# Patient Record
Sex: MALE | Race: WHITE
[De-identification: names, ages, dates, MRNs, and addresses within clinical notes are randomized per-mention and may not be internally consistent; named-entity substitution may affect disease eponyms.]

---

## 2017-02-16 ENCOUNTER — HOSPITAL ENCOUNTER (OUTPATIENT)
Dept: HOSPITAL 45 - C.LAB | Age: 77
Discharge: HOME | End: 2017-02-16
Payer: COMMERCIAL

## 2017-02-16 DIAGNOSIS — D64.9: Primary | ICD-10-CM

## 2017-02-16 LAB
BASOPHILS # BLD: 0.03 K/UL (ref 0–0.2)
BASOPHILS NFR BLD: 0.7 %
COMPLETE: YES
EOSINOPHIL NFR BLD AUTO: 274 K/UL (ref 130–400)
FERRITIN SERPL-MCNC: 116.8 NG/ML (ref 8–388)
HCT VFR BLD CALC: 34.2 % (ref 42–52)
IG%: 0.2 %
IMM GRANULOCYTES NFR BLD AUTO: 15.8 %
LYMPHOCYTES # BLD: 0.7 K/UL (ref 1.2–3.4)
MCH RBC QN AUTO: 32.1 PG (ref 25–34)
MCHC RBC AUTO-ENTMCNC: 35.4 G/DL (ref 32–36)
MCV RBC AUTO: 90.7 FL (ref 80–100)
MONOCYTES NFR BLD: 12.4 %
NEUTROPHILS # BLD AUTO: 4.8 %
NEUTROPHILS NFR BLD AUTO: 66.1 %
PMV BLD AUTO: 8.7 FL (ref 7.4–10.4)
RBC # BLD AUTO: 3.77 M/UL (ref 4.7–6.1)
WBC # BLD AUTO: 4.42 K/UL (ref 4.8–10.8)

## 2017-07-03 ENCOUNTER — HOSPITAL ENCOUNTER (OUTPATIENT)
Dept: HOSPITAL 45 - X.SURG | Age: 77
Discharge: HOME | End: 2017-07-03
Attending: OPHTHALMOLOGY
Payer: COMMERCIAL

## 2017-07-03 VITALS
DIASTOLIC BLOOD PRESSURE: 92 MMHG | OXYGEN SATURATION: 100 % | HEART RATE: 51 BPM | TEMPERATURE: 97.16 F | SYSTOLIC BLOOD PRESSURE: 156 MMHG

## 2017-07-03 VITALS
BODY MASS INDEX: 26.12 KG/M2 | WEIGHT: 170.35 LBS | HEIGHT: 67.52 IN | BODY MASS INDEX: 26.12 KG/M2 | HEIGHT: 67.52 IN | WEIGHT: 170.35 LBS

## 2017-07-03 DIAGNOSIS — I10: ICD-10-CM

## 2017-07-03 DIAGNOSIS — M19.90: ICD-10-CM

## 2017-07-03 DIAGNOSIS — D64.9: ICD-10-CM

## 2017-07-03 DIAGNOSIS — H26.9: Primary | ICD-10-CM

## 2017-07-03 RX ADMIN — TROPICAMIDE SCH DROP: 10 SOLUTION/ DROPS OPHTHALMIC at 10:17

## 2017-07-03 RX ADMIN — MOXIFLOXACIN HYDROCHLORIDE SCH DROP: 5 SOLUTION/ DROPS OPHTHALMIC at 10:30

## 2017-07-03 RX ADMIN — MOXIFLOXACIN HYDROCHLORIDE SCH DROP: 5 SOLUTION/ DROPS OPHTHALMIC at 10:20

## 2017-07-03 RX ADMIN — PHENYLEPHRINE HYDROCHLORIDE SCH DROP: 25 SOLUTION/ DROPS OPHTHALMIC at 10:16

## 2017-07-03 RX ADMIN — KETOROLAC TROMETHAMINE SCH DROP: 5 SOLUTION OPHTHALMIC at 10:24

## 2017-07-03 RX ADMIN — PHENYLEPHRINE HYDROCHLORIDE SCH DROP: 25 SOLUTION/ DROPS OPHTHALMIC at 10:21

## 2017-07-03 RX ADMIN — CYCLOPENTOLATE HYDROCHLORIDE SCH DROP: 10 SOLUTION/ DROPS OPHTHALMIC at 10:18

## 2017-07-03 RX ADMIN — TROPICAMIDE SCH DROP: 10 SOLUTION/ DROPS OPHTHALMIC at 10:22

## 2017-07-03 RX ADMIN — KETOROLAC TROMETHAMINE SCH DROP: 5 SOLUTION OPHTHALMIC at 10:19

## 2017-07-03 RX ADMIN — CYCLOPENTOLATE HYDROCHLORIDE SCH DROP: 10 SOLUTION/ DROPS OPHTHALMIC at 10:23

## 2017-07-03 NOTE — ANESTHESIA PROGRESS NT - [facility]
Anesthesia Post Op Note


Date & Time


Jul 3, 2017 at 12:07





Vital Signs


Pain Intensity:  0





Vital Signs Past 12 Hours








  Date Time  Temp Pulse Resp B/P (MAP) Pulse Ox O2 Delivery O2 Flow Rate FiO2


 


7/3/17 11:39 36.0 66 16 150/95 (113) 97 Room Air  


 


7/3/17 09:59 36.4 52 16 159/92 (114) 100 Room Air  











Notes


Mental Status:  alert / awake / arousable, participated in evaluation


Pt Amnestic to Procedure:  Yes


Nausea / Vomiting:  adequately controlled


Pain:  adequately controlled


Airway Patency, RR, SpO2:  stable & adequate


BP & HR:  stable & adequate


Hydration State:  stable & adequate


Anesthetic Complications:  no major complications apparent

## 2017-07-03 NOTE — [facility] OPERATIVE REPORT
Operative Report


Operative Date


Jul 3, 2017.





Pre-Operative Diagnosis





Cataract Left Eye





Post-Operative Diagnosis





Same





Procedure(s) Performed





Left Cataract Phacoemulsification With Intraocular Lens Implant





Surgeon


Dr. Mcmahan





Assistant Surgeon(s)


None





Estimated Blood Loss


0 mL





Findings


cataract left eye





Specimens





None





Drains


none





Anesthesia


local with sedation





Complication(s)


None





Disposition


Recovery Room / PACU





Implants


MX60 17.0





Indications


decreased vision left eye





Description of Procedure


After informed consent was obtained in the holding area the patient was


wheeled back to the operating room where cardiac monitoring leads and oxygen


by nasal cannula was administered by Anesthesia. Gentle IV sedation was


given, and the patient's left eye was prepped and draped in usual sterile


fashion. A wire lid speculum was placed into the left eye and the operating


microscope was swung into position. Using 0.12 forceps and a Supersharp blade


a paracentesis port was made 3 o'clock hours away from the 3 o'clock position


of the patient's left eye. 1% non-preserved Lidocaine was then injected into


the anterior chamber for anesthesia followed by 1ml of nonpreserved epinephrine 

in 3ml of bss for pupillary dilation.  A 2.2 mm keratotome blade was then used


to make a shelved clear corneal incision at the 3 o'clock position of the


left eye. Amvisc was injected into the anterior chamber and a cystotome and


Utrata forceps were used to perform a curvilinear capsulorrhexis. BSS on a


hydrodissection cannula was used to hydrodissect the lens nucleus away from


the capsular bag. The phacoemulsification handpiece was then used in a stop


and chop fashion to remove the lens nucleus. The irrigation and aspiration


handpiece was then used to remove the residual cortical material. Amvisc was


injected into the capsular bag and anterior chamber and a [employer] MX60


17.0 Diopter intraocular lens was injected into the capsular bag.  Irrigation


and aspiration handpiece was used to remove the residual viscoelastic


material. The wounds were hydrated and noted to be watertight.  The wire lid


speculum was removed from the eye.  Vigamox, Brimonidine, and TobraDex


ointment were placed on the eye and it was shielded.  It should be noted that


EndoCoat was used extensively during the case to protect the cornea endothelium.


 


DISPOSITION:  The patient tolerated the procedure well and was wheeled to the


post anesthesia care unit in stable condition.


I attest to the content of the Intraoperative Record and any orders documented 

therein.  Any exceptions are noted below.


I attest to the content of the Intraoperative Record and any orders documented 

therein.  Any exceptions are noted below.

## 2017-07-03 NOTE — DISCHARGE INSTRUCTIONS-[facility]
Discharge Instructions


Date of Service


Jul 3, 2017.





Visit


Reason for Visit:  Cataract Left Eye





Discharge


Discharge Diagnosis / Problem:  cataract left eye





Discharge Goals


Goal(s):  Improve function





Activity Recommendations


Activity Limitations:  per Instructions/Follow-up section


Lifting Limitations:  no more than 5 pounds





Anesthesia


.





Post Anesthesia Instructions:





If you have had General Anesthesia or IV Sedation:





*  Do not drive today.


*  Resume driving when surgeon permits.


*  Do not make important decisions or sign legal documents today.


*  Call surgeon for:





   1.  Temperature elevations greater than 101 degrees F.


   2.  Uncontrollable pain.


   3.  Excessive bleeding.


   4.  Persistent nausea and vomiting.


   5.  Medication intolerance (nausea, vomiting or rash).





*  For nausea and vomiting use only clear liquids such as: tea, soda, bouillon 

until nausea subsides, then gradually increase diet as tolerated.





*  If you have any concerns or questions, call your surgeon's office.  If 

physician is unavailable and it is an emergency, call 911 or go to the nearest 

emergency room.





.





Instructions / Follow-Up


Instructions / Follow-Up





ACTIVITY RECOMMENDATIONS:





*  Light activities





*  You may walk outside, read, watch television.





*  Mild irritation and blurred vision are common for the first few days, 

redness around the white part of the eye is common.








MEDICATIONS:





Resume previous medications unless instructed otherwise by your surgeon.





Eye drops (today and tomorrow):


   


   Cipro - one drop in operative eye every 2 hours while awake


   Prednisolone 1% - one drop in operative eye every 2 hours while awake


   Ilevro - one drop operative eye 1 times daily








SPECIAL CARE INSTRUCTIONS:





*  If any problems or concerns, please call Dr. Mcmahan's office at [phone].





*  Keep plastic shield taped over eye to sleep at night.





*  Keep plastic shield taped over eye except to administer eye drops.





*  Keep plastic shield on until office visit the following day.








FOLLOW UP VISIT:





Follow-up with Dr. Mcmahan in the [place] office as scheduled.





If not already scheduled, please call the office at [phone].





Diet Recommendations


Home Diet:  resume previous diet





Procedures


Procedures Performed:  


Left Cataract Phacoemulsification With Intraocular Lens Implant





Pending Studies


Studies pending at discharge:  no





Medical Emergencies








.


Who to Call and When:





Medical Emergencies:  If at any time you feel your situation is an emergency, 

please call 911 immediately.





.





Non-Emergent Contact


Non-Emergency issues call your:  Ophthalmologist





.


.








"Provider Documentation" section prepared by Farhan Mcmahan.








.

## 2017-07-05 ENCOUNTER — HOSPITAL ENCOUNTER (OUTPATIENT)
Dept: HOSPITAL 45 - C.CTS | Age: 77
Discharge: HOME | End: 2017-07-05
Attending: ORTHOPAEDIC SURGERY
Payer: COMMERCIAL

## 2017-07-05 DIAGNOSIS — M19.019: Primary | ICD-10-CM

## 2017-07-05 LAB
ANION GAP SERPL CALC-SCNC: 6 MMOL/L (ref 3–11)
APPEARANCE UR: CLEAR
BASOPHILS # BLD: 0.03 K/UL (ref 0–0.2)
BASOPHILS NFR BLD: 0.7 %
BILIRUB UR-MCNC: (no result) MG/DL
BUN SERPL-MCNC: 26 MG/DL (ref 7–18)
BUN/CREAT SERPL: 24.1 (ref 10–20)
CALCIUM SERPL-MCNC: 9.7 MG/DL (ref 8.5–10.1)
CHLORIDE SERPL-SCNC: 106 MMOL/L (ref 98–107)
CO2 SERPL-SCNC: 30 MMOL/L (ref 21–32)
COLOR UR: YELLOW
COMPLETE: YES
CREAT CL PREDICTED SERPL C-G-VRATE: 57 ML/MIN
CREAT SERPL-MCNC: 1.1 MG/DL (ref 0.6–1.4)
EOSINOPHIL NFR BLD AUTO: 287 K/UL (ref 130–400)
GLUCOSE SERPL-MCNC: 98 MG/DL (ref 70–99)
HCT VFR BLD CALC: 37.9 % (ref 42–52)
IG%: 0.2 %
IMM GRANULOCYTES NFR BLD AUTO: 13.6 %
INR PPP: 1.1 (ref 0.9–1.1)
LYMPHOCYTES # BLD: 0.6 K/UL (ref 1.2–3.4)
MANUAL MICROSCOPIC REQUIRED?: NO
MCH RBC QN AUTO: 32 PG (ref 25–34)
MCHC RBC AUTO-ENTMCNC: 34.6 G/DL (ref 32–36)
MCV RBC AUTO: 92.7 FL (ref 80–100)
MONOCYTES NFR BLD: 12.4 %
NEUTROPHILS # BLD AUTO: 3.8 %
NEUTROPHILS NFR BLD AUTO: 69.3 %
NITRITE UR QL STRIP: (no result)
PARTIAL THROMBOPLASTIN RATIO: 1
PH UR STRIP: 7.5 [PH] (ref 4.5–7.5)
PMV BLD AUTO: 8.6 FL (ref 7.4–10.4)
POTASSIUM SERPL-SCNC: 4.3 MMOL/L (ref 3.5–5.1)
PROTHROMBIN TIME: 11.3 SECONDS (ref 9–12)
RBC # BLD AUTO: 4.09 M/UL (ref 4.7–6.1)
REVIEW REQ?: NO
SODIUM SERPL-SCNC: 142 MMOL/L (ref 136–145)
SP GR UR STRIP: 1.01 (ref 1–1.03)
URINE BILL WITH OR WITHOUT MIC: (no result)
UROBILINOGEN UR-MCNC: (no result) MG/DL
WBC # BLD AUTO: 4.42 K/UL (ref 4.8–10.8)
ZZUR CULT IF INDIC CLEAN CATCH: NO

## 2017-07-05 NOTE — PAT MEDICATION INSTRUCTIONS
Service Date


Jul 5, 2017.





Current Home Medication List


Alprazolam (Xanax), 0.5 MG PO HS


Aspirin Enteric Coated (Ecotrin Or Generic), 81 MG PO QPM


Calcium/Vitamin D (Os-Alexander 500 Plus D), 1 TAB PO BID


Ezetimibe/Simvastatin (Vytorin 10MG/20MG), 0.5 TABLET PO HS


Fish Oil (Barnard-3), 1 CAP PO BID


Flaxseed (Linseed) (Flaxseed Oil), 1 TAB PO NOON


Glucosamine Sulfate (Glucosamine), 1,000 MG PO NOON


Glucosamine-Chondroitin-Vit C- (Glucosamine Chondroitin), 1 TAB PO BID


Ibuprofen (Advil), 200-800 MG PO PRN


Irbesartan (Avapro), 150 MG PO QAM


Melatonin (Kp Melatonin), 1 TAB PO HS


Metoprolol Tartrate (Lopressor), 25 MG PO BID


Multivitamin (Multivitamin), 1 TABLET PO QAM


Omeprazole (Ra Omeprazole), 20 MG PO QPM


Terazosin Hcl (Hytrin), 1 MG PO HS


[Natural Gh Formula], 1 TAB PO HS





Medication Instructions


For Your Scheduled Surgery 








- Hold the following medications 2 weeks prior to surgery:


Fish Oil (Barnard-3), 1 CAP PO BID


Flaxseed (Linseed) (Flaxseed Oil), 1 TAB PO NOON


Glucosamine Sulfate (Glucosamine), 1,000 MG PO NOON


Glucosamine-Chondroitin-Vit C- (Glucosamine Chondroitin), 1 TAB PO BID


[Natural Gh Formula], 1 TAB PO HS














- Hold the following medications the morning of surgery:


Irbesartan (Avapro), 150 MG PO QAM


Multivitamin (Multivitamin), 1 TABLET PO QAM


Calcium/Vitamin D (Os-Alexander 500 Plus D), 1 TAB PO BID


Ibuprofen (Advil), 200-800 MG PO PRN (otherwise okay to continue per surgeon)














- Take the following medications the morning of surgery with a sip of water 

OTHERWISE NOTHING TO EAT OR DRINK AFTER MIDNIGHT:


Metoprolol Tartrate (Lopressor), 25 MG PO BID











- Take the following medications as scheduled the night before surgery:


Ezetimibe/Simvastatin (Vytorin 10MG/20MG), 0.5 TABLET PO HS


Alprazolam (Xanax), 0.5 MG PO HS


Aspirin Enteric Coated (Ecotrin Or Generic), 81 MG PO QPM


Omeprazole (Ra Omeprazole), 20 MG PO QPM


Terazosin Hcl (Hytrin), 1 MG PO HS


Melatonin (Kp Melatonin), 1 TAB PO HS


Metoprolol Tartrate (Lopressor), 25 MG PO BID


Calcium/Vitamin D (Os-Alexander 500 Plus D), 1 TAB PO BID











If you have any questions please call us at [phone] or [phone] or 

[phone]

## 2017-07-05 NOTE — DIAGNOSTIC IMAGING REPORT
TWO VIEW CHEST



CLINICAL HISTORY: Preoperative examination.



FINDINGS: PA and lateral chest radiographs are compared to study dated 4/20/2016

and correlated with chest CT dated 4/27/2016. The cardiomediastinal silhouette

is unremarkable.  There is atherosclerotic calcification of the thoracic aorta.

The lungs and pleural spaces are clear. There is no pneumothorax. There are

healed right-sided rib fractures. Degenerative change and scoliosis are noted in

the thoracic spine.



IMPRESSION: No active disease in the chest.







Electronically signed by:  Dong Beth M.D.

7/5/2017 2:53 PM



Dictated Date/Time:  7/5/2017 2:52 PM

## 2017-09-01 ENCOUNTER — HOSPITAL ENCOUNTER (OUTPATIENT)
Dept: HOSPITAL 45 - C.LAB | Age: 77
Discharge: HOME | End: 2017-09-01
Attending: ORTHOPAEDIC SURGERY
Payer: COMMERCIAL

## 2017-09-01 DIAGNOSIS — I10: ICD-10-CM

## 2017-09-01 DIAGNOSIS — M19.012: ICD-10-CM

## 2017-09-01 DIAGNOSIS — Z01.812: Primary | ICD-10-CM

## 2017-09-01 DIAGNOSIS — E78.5: ICD-10-CM

## 2017-09-01 LAB
ALT SERPL-CCNC: 30 U/L (ref 12–78)
ANION GAP SERPL CALC-SCNC: 4 MMOL/L (ref 3–11)
APPEARANCE UR: CLEAR
AST SERPL-CCNC: 24 U/L (ref 15–37)
BASOPHILS # BLD: 0.02 K/UL (ref 0–0.2)
BASOPHILS NFR BLD: 0.6 %
BILIRUB UR-MCNC: (no result) MG/DL
BUN SERPL-MCNC: 30 MG/DL (ref 7–18)
BUN/CREAT SERPL: 30.2 (ref 10–20)
CALCIUM SERPL-MCNC: 9 MG/DL (ref 8.5–10.1)
CHLORIDE SERPL-SCNC: 106 MMOL/L (ref 98–107)
CHOLEST/HDLC SERPL: 3.2 {RATIO}
CO2 SERPL-SCNC: 29 MMOL/L (ref 21–32)
COLOR UR: YELLOW
COMPLETE: YES
CREAT SERPL-MCNC: 1 MG/DL (ref 0.6–1.4)
EOSINOPHIL NFR BLD AUTO: 279 K/UL (ref 130–400)
GLUCOSE SERPL-MCNC: 86 MG/DL (ref 70–99)
GLUCOSE UR QL: 32 MG/DL
HCT VFR BLD CALC: 39.8 % (ref 42–52)
IG%: 0 %
IMM GRANULOCYTES NFR BLD AUTO: 13.6 %
INR PPP: 1.1 (ref 0.9–1.1)
KETONES UR QL STRIP: 61 MG/DL
LYMPHOCYTES # BLD: 0.49 K/UL (ref 1.2–3.4)
MANUAL MICROSCOPIC REQUIRED?: NO
MCH RBC QN AUTO: 32 PG (ref 25–34)
MCHC RBC AUTO-ENTMCNC: 34.2 G/DL (ref 32–36)
MCV RBC AUTO: 93.6 FL (ref 80–100)
MONOCYTES NFR BLD: 13.6 %
NEUTROPHILS # BLD AUTO: 6.4 %
NEUTROPHILS NFR BLD AUTO: 65.8 %
NITRITE UR QL STRIP: (no result)
NITRITE UR QL STRIP: 44 MG/DL (ref 0–150)
PARTIAL THROMBOPLASTIN RATIO: 1.1
PH UR STRIP: 6.5 [PH] (ref 4.5–7.5)
PH UR: 102 MG/DL (ref 0–200)
PMV BLD AUTO: 8.8 FL (ref 7.4–10.4)
POTASSIUM SERPL-SCNC: 4.1 MMOL/L (ref 3.5–5.1)
PROTHROMBIN TIME: 11.8 SECONDS (ref 9–12)
RBC # BLD AUTO: 4.25 M/UL (ref 4.7–6.1)
REVIEW REQ?: NO
SODIUM SERPL-SCNC: 139 MMOL/L (ref 136–145)
SP GR UR STRIP: 1.02 (ref 1–1.03)
URINE BILL WITH OR WITHOUT MIC: (no result)
UROBILINOGEN UR-MCNC: (no result) MG/DL
VERY LOW DENSITY LIPOPROT CALC: 9 MG/DL
WBC # BLD AUTO: 3.61 K/UL (ref 4.8–10.8)

## 2017-09-15 NOTE — HISTORY & PHYSICAL EXAMINATION
DATE OF ADMISSION:  09/18/2017

 

CHIEF COMPLAINT:  Primary osteoarthritis of the left shoulder.

 

HISTORY OF PRESENT ILLNESS:  Edinson is a pleasant 77-year-old male who has

been dealing with chronic left shoulder pain.  X-rays and clinical

examination are diagnostic for primary osteoarthritis of the left shoulder. 

He is an avid [employer].  He has elected to proceed with a left total 

shoulder arthroplasty.  He understands all the risks, benefits, alternatives

to procedure and elected to proceed.

 

PAST MEDICAL HISTORY:  Hyperlipidemia, hypertension, and prostate cancer.

 

MEDICATIONS:  Include alprazolam 0.5 mg daily, Avapro 150 mg daily,

metoprolol 25 mg twice a day, Vytorin 10/20, one-half  tab daily, omeprazole

20 mg daily, calcium 600 mg twice a day, glucosamine 1500 mg daily, aspirin

81 mg daily, terazosin 1 mg daily, and a daily multivitamin.

 

PAST SURGICAL HISTORY:  Significant for surgery to his head, surgery to his

neck, his back and his arm.

 

ALLERGIES:  None.

 

FAMILY HISTORY:  Noncontributory.

 

SOCIAL HISTORY:  The patient is .  Rarely drinks, is very active.

 

REVIEW OF SYSTEMS:  The patient complains of left shoulder pain.  All other

pertinent review of systems are negative.

 

PHYSICAL EXAMINATION:

GENERAL:  He is awake, alert and oriented x3.  He is in no apparent distress.

 He is very pleasant.

HEAD, EYES, EARS, NOSE, AND THROAT:  Pupils are equal, round and reactive to

light.  Extraocular motion intact.  Oral mucosa is pink and moist.

HEART:  Regular rate per radial pulse.

LUNGS:  Juanis symmetrically bilaterally with no audible breath sounds.

ABDOMEN:  Soft, nontender, nondistended.

MUSCULOSKELETAL:  On physical examination of the left shoulder, he has about

140 degrees of forward flexion and 40 degrees of abduction.  He has 5/5

muscle strength, full can testing and external rotation.  He has significant

crepitus throughout.  Pain over the anterior glenohumeral joint line.

 

X-rays of the left shoulder do show advanced osteoarthritis with complete

loss of joint space and osteophyte formation.

 

IMPRESSION:  Primary osteoarthritis of the left shoulder.

 

PLAN:  We will proceed with a Biomet comprehensive left total shoulder

arthroplasty.  Postoperatively, he will be placed in a sling and kept

overnight for postoperative medical management.

## 2017-09-18 ENCOUNTER — HOSPITAL ENCOUNTER (INPATIENT)
Dept: HOSPITAL 45 - C.ACU | Age: 77
LOS: 1 days | Discharge: HOME | DRG: 483 | End: 2017-09-19
Attending: ORTHOPAEDIC SURGERY | Admitting: ORTHOPAEDIC SURGERY
Payer: COMMERCIAL

## 2017-09-18 VITALS
WEIGHT: 165.35 LBS | WEIGHT: 165.35 LBS | BODY MASS INDEX: 25.06 KG/M2 | BODY MASS INDEX: 25.06 KG/M2 | BODY MASS INDEX: 25.06 KG/M2 | HEIGHT: 68 IN | HEIGHT: 68 IN

## 2017-09-18 VITALS
HEART RATE: 68 BPM | OXYGEN SATURATION: 97 % | SYSTOLIC BLOOD PRESSURE: 173 MMHG | TEMPERATURE: 98.06 F | DIASTOLIC BLOOD PRESSURE: 88 MMHG

## 2017-09-18 VITALS
SYSTOLIC BLOOD PRESSURE: 122 MMHG | OXYGEN SATURATION: 94 % | TEMPERATURE: 98.24 F | HEART RATE: 80 BPM | DIASTOLIC BLOOD PRESSURE: 70 MMHG

## 2017-09-18 VITALS — DIASTOLIC BLOOD PRESSURE: 91 MMHG | SYSTOLIC BLOOD PRESSURE: 167 MMHG | OXYGEN SATURATION: 97 % | HEART RATE: 68 BPM

## 2017-09-18 VITALS
OXYGEN SATURATION: 97 % | TEMPERATURE: 97.88 F | SYSTOLIC BLOOD PRESSURE: 148 MMHG | HEART RATE: 107 BPM | DIASTOLIC BLOOD PRESSURE: 83 MMHG

## 2017-09-18 VITALS
SYSTOLIC BLOOD PRESSURE: 172 MMHG | DIASTOLIC BLOOD PRESSURE: 91 MMHG | TEMPERATURE: 97.52 F | OXYGEN SATURATION: 96 % | HEART RATE: 62 BPM

## 2017-09-18 VITALS — SYSTOLIC BLOOD PRESSURE: 124 MMHG | HEART RATE: 97 BPM | OXYGEN SATURATION: 96 % | DIASTOLIC BLOOD PRESSURE: 78 MMHG

## 2017-09-18 VITALS — OXYGEN SATURATION: 95 %

## 2017-09-18 VITALS
TEMPERATURE: 98.06 F | OXYGEN SATURATION: 97 % | DIASTOLIC BLOOD PRESSURE: 93 MMHG | SYSTOLIC BLOOD PRESSURE: 164 MMHG | HEART RATE: 93 BPM

## 2017-09-18 VITALS
DIASTOLIC BLOOD PRESSURE: 92 MMHG | TEMPERATURE: 97.34 F | OXYGEN SATURATION: 97 % | SYSTOLIC BLOOD PRESSURE: 166 MMHG | HEART RATE: 57 BPM

## 2017-09-18 DIAGNOSIS — Z79.82: ICD-10-CM

## 2017-09-18 DIAGNOSIS — Z85.46: ICD-10-CM

## 2017-09-18 DIAGNOSIS — M19.012: Primary | ICD-10-CM

## 2017-09-18 DIAGNOSIS — I10: ICD-10-CM

## 2017-09-18 DIAGNOSIS — Z79.899: ICD-10-CM

## 2017-09-18 DIAGNOSIS — E78.5: ICD-10-CM

## 2017-09-18 PROCEDURE — 0RRK0JZ REPLACEMENT OF LEFT SHOULDER JOINT WITH SYNTHETIC SUBSTITUTE, OPEN APPROACH: ICD-10-PCS | Performed by: ORTHOPAEDIC SURGERY

## 2017-09-18 RX ADMIN — ACETAMINOPHEN SCH MLS/HR: 10 INJECTION, SOLUTION INTRAVENOUS at 18:31

## 2017-09-18 RX ADMIN — Medication SCH TAB: at 21:31

## 2017-09-18 RX ADMIN — TRANEXAMIC ACID SCH MLS/HR: 100 INJECTION, SOLUTION INTRAVENOUS at 06:30

## 2017-09-18 RX ADMIN — METOPROLOL TARTRATE SCH MG: 25 TABLET, FILM COATED ORAL at 21:34

## 2017-09-18 RX ADMIN — CEFAZOLIN SCH MLS/HR: 10 INJECTION, POWDER, FOR SOLUTION INTRAVENOUS at 21:29

## 2017-09-18 RX ADMIN — KETOROLAC TROMETHAMINE SCH MG: 15 INJECTION INTRAMUSCULAR; INTRAVENOUS at 23:44

## 2017-09-18 RX ADMIN — TRANEXAMIC ACID SCH MLS/HR: 100 INJECTION, SOLUTION INTRAVENOUS at 11:56

## 2017-09-18 RX ADMIN — KETOROLAC TROMETHAMINE SCH MG: 15 INJECTION INTRAMUSCULAR; INTRAVENOUS at 18:32

## 2017-09-18 RX ADMIN — POTASSIUM CHLORIDE SCH MLS/HR: 149 INJECTION, SOLUTION, CONCENTRATE INTRAVENOUS at 16:54

## 2017-09-18 RX ADMIN — DOCUSATE SODIUM SCH MG: 100 CAPSULE, LIQUID FILLED ORAL at 21:32

## 2017-09-18 NOTE — [facility] POST OPERATIVE BRIEF NOTE
Immediate Operative Summary


Operative Date


Sep 18, 2017.





Pre-Operative Diagnosis





Primary osteoarthritis of left shoulder





Post-Operative Diagnosis





same as preop





Procedure(s) Performed





Left total shoulder arthroplasty





Surgeon


Dr. Eulalio Schafer





Assistant Surgeon(s)


Rigoberto Pascal PA-C





Estimated Blood Loss


250 ml





Findings


as above





Specimens





A: Left humeral head





Complication(s)


None





Disposition


Recovery Room / PACU

## 2017-09-18 NOTE — ANESTHESIOLOGY PROGRESS NOTE
Anesthesia Post Op Note


Date & Time


Sep 18, 2017 at 15:12





Vital Signs


Pain Intensity:  1





Vital Signs Past 12 Hours








  Date Time  Temp Pulse Resp B/P (MAP) Pulse Ox O2 Delivery O2 Flow Rate FiO2


 


9/18/17 14:56    159/88    


 


9/18/17 14:55  64 17  99   


 


9/18/17 14:55  64 17     


 


9/18/17 14:51    166/86    


 


9/18/17 14:50  79 16     


 


9/18/17 14:50  80 16  99   


 


9/18/17 14:46    165/97    


 


9/18/17 14:45  84 16     


 


9/18/17 14:45  84 16  100   


 


9/18/17 14:42    161/90    


 


9/18/17 14:40  69 14  99   


 


9/18/17 14:40  69 14     


 


9/18/17 14:36    175/99    


 


9/18/17 14:35  73 14  100   


 


9/18/17 14:35  75 14     


 


9/18/17 14:31    172/115    


 


9/18/17 14:30  93 18  100   


 


9/18/17 14:30  95 18     


 


9/18/17 14:26    185/96    


 


9/18/17 14:25  94 19     


 


9/18/17 14:25  94 19  100   


 


9/18/17 14:22    172/96    


 


9/18/17 14:15 36.2 110 18 181/97 99 Oxymask 10 


 


9/18/17 09:38 36.3 57 18 166/92 97 Room Air  











Notes


Mental Status:  alert / awake / arousable, participated in evaluation


Pt Amnestic to Procedure:  Yes


Nausea / Vomiting:  adequately controlled


Pain:  adequately controlled


Airway Patency, RR, SpO2:  stable & adequate


BP & HR:  stable & adequate


Hydration State:  stable & adequate


Anesthetic Complications:  no major complications apparent

## 2017-09-18 NOTE — OPERATIVE REPORT
DATE OF OPERATION:  09/18/2017

 

PREOPERATIVE DIAGNOSIS:  Osteoarthritis of the left shoulder.

 

POSTOPERATIVE DIAGNOSIS:  Same.

 

PROCEDURE:  Left total shoulder arthroplasty.

 

SURGEON:  Dr. Eulalio Schafer.

 

ASSISTANT:  Ector Pascal PA-C, whose assistance was necessary for positioning

the arm and helping with instrumentation and retraction.

 

ANESTHESIA:  General with a left interscalene nerve block.

 

COMPLICATIONS:  None.

 

CONDITION:  Stable to PACU.

 

IMPLANTS USED:  I used a Biomet comprehensive left total shoulder

arthroplasty with a size 16 mini stem, a 46 x 18 Versa-Dial eccentric humeral

head and a size medium glenoid with a Regenerex peg.  The glenoid was

cemented with Palacos-G cement.

 

INDICATIONS:  Edinson is a 77-year-old male who presented to my office with

chronic increasing left shoulder pain.  X-rays and clinical examination were

diagnostic for primary osteoarthritis of the left shoulder.  After failing

extensive conservative treatment, he elected to undergo a left total shoulder

arthroplasty.

 

DESCRIPTION OF PROCEDURE:  On 09/18/2017, he arrived at [facility] for the above procedure.  He was seen in the preoperative

holding area and the operative extremity was identified and signed.  He was

given preoperative antibiotics and a left interscalene nerve block.  He was

taken back to the operating room, laid on the table in supine position and

put under general anesthesia.  He was then put into the beachchair position. 

The left shoulder was prepped and draped in sterile fashion.  Time-out was

done and the patient and operative extremity was properly identified.

 

A deltopectoral approach was used.  Dissection was taken down through the

fascia and the anterior shoulder was exposed.  The long head of biceps tendon

was tenodesed to the upper border of pec major.  The rotator interval was

opened up.  The subscapularis was then tenotomized off the lesser tuberosity

with a centimeter of cuff tissue remaining.  The shoulder was then

dislocated.  A canal finding reamer was sent down the center of the humeral

canal.  Sequential reaming up to a size 16 reamer was done.  Off that reamer,

a proximal humeral resection guide was placed and the proximal humerus was

resected at 135 degrees of inclination and 30 degrees of retroversion. 

Inferior osteophytes were then removed and the glenoid was exposed.  The

superior and anterior inferior labrums were removed.  The remainder of the

labrum and the ligaments were left intact.  The Biomet signature guide was

then snapped on to the superior aspect of the glenoid and a guide pin was

placed in the total shoulder arthroplasty hole.  This set the glenoid at 7

degrees of retroversion.  Eccentric reaming was done and I was able to get

the glenoid flat with subchondral bone.  A central peg hole was then drilled

followed by 3 peripheral peg holes.  The final size medium glenoid was then

cemented in place with Palacos-G cement.  Once cement had hardened, the

proximal humerus was exposed.  Sequential broaching up to a size 16 broach

was done.  A trial 46 x 18 mm head was used.  The shoulder was reduced,

brought through a full range of motion and felt to be stable.  The trials

were then removed.  The final 16 mini stem was impacted into place.  A 46 x

18 eccentric head was then impacted onto the humeral stem and the shoulder

was reduced.  It felt stable.  The surrounding soft tissues were then

injected with 100 mL of an orthopedic pain control cocktail.  The joint was

then irrigated with 3 liters of normal saline solution with bacitracin.  The

subscapularis was then tenodesed back to the lesser tuberosity with

transosseous FiberWire sutures and side-to-side sutures.  The rotator

interval was closed.  He still had good external rotation.  The axillary

nerve was then palpated.  A drain was placed.  Skin was closed with 2-0

Vicryl, 3-0 V-Loc suture and staples.  He was placed in a soft dressing and

regular arm sling.  He was then extubated, transferred to a [facility] and taken

to the postanesthesia care unit in stable condition.  He tolerated the

procedure well.

 

 

I attest to the content of the Intraoperative Record and any orders documented therein. Any exception
s are noted below.

## 2017-09-18 NOTE — DIAGNOSTIC IMAGING REPORT
LEFT SHOULDER MIN 2 VIEWS ROUTINE



CLINICAL HISTORY: Post shoulder surgery.    



COMPARISON: CT of the left shoulder July 5, 2017.



FINDINGS:  Alignment of the left shoulder arthroplasty is anatomic. There is no

periprosthetic fracture or unexpected radiopaque foreign body. Surgical drains

are in place. There are skin staples.



IMPRESSION: Expected findings following left shoulder arthroplasty.







Electronically signed by:  Graham Muñoz M.D.

9/18/2017 2:42 PM



Dictated Date/Time:  9/18/2017 2:41 PM

## 2017-09-19 VITALS
HEART RATE: 64 BPM | OXYGEN SATURATION: 94 % | SYSTOLIC BLOOD PRESSURE: 114 MMHG | DIASTOLIC BLOOD PRESSURE: 69 MMHG | TEMPERATURE: 98.24 F

## 2017-09-19 VITALS
OXYGEN SATURATION: 98 % | DIASTOLIC BLOOD PRESSURE: 82 MMHG | HEART RATE: 57 BPM | TEMPERATURE: 97.88 F | SYSTOLIC BLOOD PRESSURE: 164 MMHG

## 2017-09-19 VITALS
DIASTOLIC BLOOD PRESSURE: 82 MMHG | SYSTOLIC BLOOD PRESSURE: 164 MMHG | HEART RATE: 57 BPM | OXYGEN SATURATION: 98 % | TEMPERATURE: 97.88 F

## 2017-09-19 LAB
ANION GAP SERPL CALC-SCNC: 7 MMOL/L (ref 3–11)
BUN SERPL-MCNC: 23 MG/DL (ref 7–18)
BUN/CREAT SERPL: 18 (ref 10–20)
CALCIUM SERPL-MCNC: 8.6 MG/DL (ref 8.5–10.1)
CHLORIDE SERPL-SCNC: 102 MMOL/L (ref 98–107)
CO2 SERPL-SCNC: 26 MMOL/L (ref 21–32)
CREAT CL PREDICTED SERPL C-G-VRATE: 47.5 ML/MIN
CREAT SERPL-MCNC: 1.3 MG/DL (ref 0.6–1.4)
EOSINOPHIL NFR BLD AUTO: 215 K/UL (ref 130–400)
GLUCOSE SERPL-MCNC: 126 MG/DL (ref 70–99)
HCT VFR BLD CALC: 35.2 % (ref 42–52)
MCH RBC QN AUTO: 31.8 PG (ref 25–34)
MCHC RBC AUTO-ENTMCNC: 34.4 G/DL (ref 32–36)
MCV RBC AUTO: 92.6 FL (ref 80–100)
PMV BLD AUTO: 8.7 FL (ref 7.4–10.4)
POTASSIUM SERPL-SCNC: 4.3 MMOL/L (ref 3.5–5.1)
RBC # BLD AUTO: 3.8 M/UL (ref 4.7–6.1)
SODIUM SERPL-SCNC: 135 MMOL/L (ref 136–145)
WBC # BLD AUTO: 12.36 K/UL (ref 4.8–10.8)

## 2017-09-19 RX ADMIN — CEFAZOLIN SCH MLS/HR: 10 INJECTION, POWDER, FOR SOLUTION INTRAVENOUS at 03:40

## 2017-09-19 RX ADMIN — METOPROLOL TARTRATE SCH MG: 25 TABLET, FILM COATED ORAL at 08:56

## 2017-09-19 RX ADMIN — DOCUSATE SODIUM SCH MG: 100 CAPSULE, LIQUID FILLED ORAL at 08:56

## 2017-09-19 RX ADMIN — ACETAMINOPHEN SCH MLS/HR: 10 INJECTION, SOLUTION INTRAVENOUS at 10:18

## 2017-09-19 RX ADMIN — KETOROLAC TROMETHAMINE SCH MG: 15 INJECTION INTRAMUSCULAR; INTRAVENOUS at 05:23

## 2017-09-19 RX ADMIN — ACETAMINOPHEN SCH MLS/HR: 10 INJECTION, SOLUTION INTRAVENOUS at 02:41

## 2017-09-19 RX ADMIN — Medication SCH TAB: at 08:56

## 2017-09-19 RX ADMIN — POTASSIUM CHLORIDE SCH MLS/HR: 149 INJECTION, SOLUTION, CONCENTRATE INTRAVENOUS at 02:41

## 2017-09-19 NOTE — DISCHARGE INSTRUCTIONS
Discharge Instructions


Date of Service


Sep 19, 2017.





Admission


Reason for Admission:  Left Shoulder Degenerative Joint Disease





Discharge


Discharge Diagnosis / Problem:  Left Total Shoulder





Discharge Goals


Goal(s):  Decrease discomfort, Improve function





Activity Recommendations


Activity Limitations:  as noted below





.





Instructions / Follow-Up


Instructions / Follow-Up





Activity and Therapy Recommendations:





* Wear your sling for 3 weeks, unless otherwise instructed.  You may remove 

your sling to shower and to dress, but otherwise, you should be in your sling 

at all times, including while sleeping





* The shoulder replacement is very stable and you can use your hand while in 

the sling





* Physical Therapy should start about 3-5 days from your day of surgery.  

Therapy will last about 8-12 weeks





* You were shown a series of exercises in the hospital. Do these exercises 

daily including the exercises you were shown in physical therapy.





Medications:





* Narcotic  You will likely be sent home from the hospital with a prescription 

for the narcotic pain medication that worked best throughout your stay.





* Other medications may be prescribed for specific circumstances.  If you have 

any questions, please call the office at [phone].





* Resume previous home medications unless otherwise instructed











You may shower 5 days from the day of surgery.  Let the soapy shower water run 

over the staples.  Do not scrub or soak the incision.





Things To Watch For:





* Drainage from the incision site that occurs more than one week after your 

surgery.





* Increased redness at the incision site.





* Fever above 102 degrees Fahrenheit.





* Unusual chest pain or shortness of breath.





* Call [facility] at [phone] with any of the above 

problems 





Follow-Up Visit:





Follow-up with Dr. Schafer 2-3 weeks after your day of surgery.  An appointment 

was probably scheduled when you signed-up for surgery in the office.  If you 

have any questions call [phone]





Office Instructions:





More detailed instructions as well as Frequently Asked Questions were provided 

in a folder by our office when you signed-up for surgery.  Please review these 

instructions when you get home.  If you have any further questions or concerns, 

please feel free to call the office at [phone]





Current Hospital Diet


Patient's current hospital diet: Regular Diet





Discharge Diet


Recommended Diet:  Regular Diet





Procedures


Procedures Performed:  


Left total shoulder arthroplasty





Pending Studies


Studies pending at discharge:  no





Laboratory Results





Lipid Panel








Test


  9/1/17


07:14 Range/Units


 


 


Triglycerides Level 44  0-150  mg/dl


 


Cholesterol Level 102  0-200  mg/dl


 


HDL Cholesterol 32   mg/dl


 


Cholesterol/HDL Ratio 3.2   


 


LDL Cholesterol, Calculated 61   mg/dl











Medical Emergencies








.


Who to Call and When:





Medical Emergencies:  If at any time you feel your situation is an emergency, 

please call 911 immediately.





.





Non-Emergent Contact


Non-Emergency issues call your:  Surgeon


Call Non-Emergent contact if:  wound has increased drainage, wound has 

increased redness


.








"Provider Documentation" section prepared by Eulalio Schafer.








.





VTE Core Measure


Inpt VTE Proph given/why not?:  Treatment not indicated

## 2017-09-19 NOTE — PROGRESS NOTE
DATE: 09/19/2017

 

CHIEF COMPLAINT:  Status post left total shoulder arthroplasty, postop day

#1.

 

PROGRESS:  Edinson was seen and examined at bedside today.  Overall, he is

doing very well.  He has very little pain in the shoulder.  He had a little

urinary retention overnight and needed a catheterization.  He has urinary

retention.  He has no other complaints.

 

PHYSICAL EXAMINATION:

LEFT SHOULDER:  The dressing is clean and dry and the drain is to suction. 

He is wearing a sling as instructed.  His radial, median and ulnar nerves

were checked and intact at his wrist.  His axillary nerve was not checked

yet.

 

LABORATORY DATA:  He has an H&H today of 12.1 and 35.2.  His glucose is 126.

 

His vital signs are all stable on room air and he is voiding a little bit on

his own.

 

IMPRESSION:  Status post left total shoulder arthroplasty, postop day #1.

 

PLAN:  At this point, he is doing fairly well.  The nursing staff can change

the dressing and pull out the drain and I will discharge him home later

today.

 

 

 

 

KAY

## 2017-09-19 NOTE — ANESTHESIOLOGY PROGRESS NOTE
Anesthesia Post Op Note


Date & Time


Sep 19, 2017 at 09:03





Vital Signs


Pain Intensity:  2.0





Vital Signs Past 12 Hours








  Date Time  Temp Pulse Resp B/P (MAP) Pulse Ox O2 Delivery O2 Flow Rate FiO2


 


9/19/17 07:14 36.6 57 17 164/82 (109) 98 Room Air  


 


9/19/17 07:06      Room Air  


 


9/19/17 03:41 36.8 64 16 114/69 (84) 94 Room Air  


 


9/18/17 23:50      Room Air  


 


9/18/17 23:27 36.8 80 18 122/70 (87) 94 Room Air  


 


9/18/17 22:13     95 Room Air  


 


9/18/17 21:16  97 16 124/78 (93) 96 Nasal Cannula 2.0 











Notes


Mental Status:  alert / awake / arousable, participated in evaluation


Pt Amnestic to Procedure:  Yes


Nausea / Vomiting:  adequately controlled


Pain:  adequately controlled


Airway Patency, RR, SpO2:  stable & adequate


BP & HR:  stable & adequate


Hydration State:  stable & adequate


Anesthetic Complications:  no major complications apparent

## 2017-09-20 NOTE — DISCHARGE SUMMARY
DISCHARGE DIAGNOSIS:  Primary osteoarthritis of the left shoulder.

 

PROCEDURE:  Left total shoulder arthroplasty on 09/18/2017 by Dr. Eulalio Schafer.

 

DISCHARGE INSTRUCTIONS:

1.  Oxycodone 5-10 mg every 4 hours as needed for pain.

2.  Xanax 0.5 mg at night.

3.  Aspirin 81 mg daily.

4.  Calcium 500 and vitamin D 1 tab twice a day.

5.  Vytorin 10/20 mg half a tablet at night.

6.  Avapro 150 mg daily.

7.  Melatonin 3 mg at night.

8.  Metoprolol 25 mg twice a day.

9.  Protonix 20 mg daily.

10.  Hytrin 1 mg at night.

11.  Left arm sling for 3 weeks.

12.  Follow up with Dr. Schafer in 2 weeks.

13.  Call the office of Dr. Schafer with any questions or concerns.

 

HOSPITAL COURSE:  Edinson is a pleasant 77-year-old male who presented to my

office with complaints of chronic left shoulder pain.  X-rays demonstrated

primary osteoarthritis of the left shoulder.  After failing conservative

treatment, he elected to undergo a left total shoulder arthroplasty.  On

09/18/2017, he arrived at [facility] and underwent a left

shoulder replacement without complications.  He had a general anesthetic and

a left interscalene nerve block.  Postoperatively, he was discharged to

general orthopedic floor.  His hospital course was uneventful.  On postop day

#1, his H&H was stable at 12.1 and 35.2.  His pain was well controlled.  He

was having a little trouble with urinary retention and he needed a straight

cath but then he was able to void some on his own afterwards.  The nursing

staff changed the dressing and pulled the drain.  Physical therapy did hand,

wrist, elbow and pendulum exercises with him.  His pain was controlled and he

was subsequently discharged to home with the above instructions.

## 2017-12-11 ENCOUNTER — HOSPITAL ENCOUNTER (OUTPATIENT)
Dept: HOSPITAL 45 - C.ULTR | Age: 77
Discharge: HOME | End: 2017-12-11
Payer: COMMERCIAL

## 2017-12-11 DIAGNOSIS — M79.662: ICD-10-CM

## 2017-12-11 DIAGNOSIS — R60.0: Primary | ICD-10-CM

## 2017-12-11 DIAGNOSIS — I82.812: ICD-10-CM

## 2017-12-11 NOTE — DIAGNOSTIC IMAGING REPORT
ULTRASOUND LEFT LOWER EXTREMITY VENOUS 



CLINICAL HISTORY: Calf pain and swelling.



COMPARISON STUDY: No priors.



TECHNIQUE: Real-time, grayscale, and color Doppler sonography of the deep veins

of the left lower extremity was performed from the inguinal crease to the calf.

Compression and augmentation were utilized. 



FINDINGS: There is no sonographic evidence of deep venous thrombosis identified

in the left lower extremity. The common femoral, superficial femoral, and

popliteal veins are patent and normally compressible. The greater saphenous vein

and the profunda femoris vein at the junction with the common femoral vein are

clear. The visualized calf veins are patent. Occlusive superficial venous

thrombus is identified in the mid calf at the area of tenderness.



IMPRESSION:



1. There is no sonographic evidence of deep venous thrombosis identified in the

left lower extremity.



2. Occlusive superficial venous thrombus is identified in the mid calf at the

site of discomfort.







Electronically signed by:  Dong Beth M.D.

12/11/2017 5:15 PM



Dictated Date/Time:  12/11/2017 5:14 PM

## 2017-12-22 ENCOUNTER — HOSPITAL ENCOUNTER (OUTPATIENT)
Dept: HOSPITAL 45 - C.LAB | Age: 77
Discharge: HOME | End: 2017-12-22
Payer: COMMERCIAL

## 2017-12-22 DIAGNOSIS — D64.9: Primary | ICD-10-CM

## 2017-12-22 DIAGNOSIS — I10: ICD-10-CM

## 2017-12-22 LAB
ALT SERPL-CCNC: 25 U/L (ref 12–78)
AST SERPL-CCNC: 19 U/L (ref 15–37)
BASOPHILS # BLD: 0.03 K/UL (ref 0–0.2)
BASOPHILS NFR BLD: 0.7 %
COMPLETE: YES
EOSINOPHIL NFR BLD AUTO: 272 K/UL (ref 130–400)
HCT VFR BLD CALC: 38.7 % (ref 42–52)
IG%: 0.2 %
IMM GRANULOCYTES NFR BLD AUTO: 17 %
LYMPHOCYTES # BLD: 0.73 K/UL (ref 1.2–3.4)
MCH RBC QN AUTO: 32.6 PG (ref 25–34)
MCHC RBC AUTO-ENTMCNC: 34.6 G/DL (ref 32–36)
MCV RBC AUTO: 94.2 FL (ref 80–100)
MONOCYTES NFR BLD: 14.2 %
NEUTROPHILS # BLD AUTO: 4.7 %
NEUTROPHILS NFR BLD AUTO: 63.2 %
PMV BLD AUTO: 8.9 FL (ref 7.4–10.4)
RBC # BLD AUTO: 4.11 M/UL (ref 4.7–6.1)
WBC # BLD AUTO: 4.3 K/UL (ref 4.8–10.8)

## 2018-04-24 ENCOUNTER — HOSPITAL ENCOUNTER (OUTPATIENT)
Dept: HOSPITAL 45 - C.LAB | Age: 78
Discharge: HOME | End: 2018-04-24
Payer: COMMERCIAL

## 2018-04-24 DIAGNOSIS — I25.10: Primary | ICD-10-CM

## 2018-04-24 DIAGNOSIS — E78.5: ICD-10-CM

## 2018-04-24 DIAGNOSIS — D64.9: ICD-10-CM

## 2018-04-24 LAB
ALT SERPL-CCNC: 28 U/L (ref 12–78)
AST SERPL-CCNC: 19 U/L (ref 15–37)
BASOPHILS # BLD: 0.03 K/UL (ref 0–0.2)
BASOPHILS NFR BLD: 0.6 %
BUN SERPL-MCNC: 21 MG/DL (ref 7–18)
CALCIUM SERPL-MCNC: 9.1 MG/DL (ref 8.5–10.1)
CO2 SERPL-SCNC: 31 MMOL/L (ref 21–32)
CREAT SERPL-MCNC: 1.28 MG/DL (ref 0.6–1.4)
EOS ABS #: 0.21 K/UL (ref 0–0.5)
EOSINOPHIL NFR BLD AUTO: 245 K/UL (ref 130–400)
GLUCOSE SERPL-MCNC: 98 MG/DL (ref 70–99)
HCT VFR BLD CALC: 39.9 % (ref 42–52)
HGB BLD-MCNC: 14.3 G/DL (ref 14–18)
IG#: 0.01 K/UL (ref 0–0.02)
IMM GRANULOCYTES NFR BLD AUTO: 12.7 %
KETONES UR QL STRIP: 72 MG/DL
LYMPHOCYTES # BLD: 0.6 K/UL (ref 1.2–3.4)
MCH RBC QN AUTO: 33.6 PG (ref 25–34)
MCHC RBC AUTO-ENTMCNC: 35.8 G/DL (ref 32–36)
MCV RBC AUTO: 93.9 FL (ref 80–100)
MONO ABS #: 0.6 K/UL (ref 0.11–0.59)
MONOCYTES NFR BLD: 12.7 %
NEUT ABS #: 3.26 K/UL (ref 1.4–6.5)
NEUTROPHILS # BLD AUTO: 4.5 %
NEUTROPHILS NFR BLD AUTO: 69.3 %
PH UR: 123 MG/DL (ref 0–200)
PMV BLD AUTO: 8.8 FL (ref 7.4–10.4)
POTASSIUM SERPL-SCNC: 4.7 MMOL/L (ref 3.5–5.1)
RED CELL DISTRIBUTION WIDTH CV: 13 % (ref 11.5–14.5)
RED CELL DISTRIBUTION WIDTH SD: 44.9 FL (ref 36.4–46.3)
SODIUM SERPL-SCNC: 138 MMOL/L (ref 136–145)
WBC # BLD AUTO: 4.71 K/UL (ref 4.8–10.8)

## 2018-07-28 ENCOUNTER — HOSPITAL ENCOUNTER (EMERGENCY)
Dept: HOSPITAL 45 - C.EDB | Age: 78
Discharge: HOME | End: 2018-07-28
Payer: COMMERCIAL

## 2018-07-28 VITALS
BODY MASS INDEX: 26.1 KG/M2 | HEIGHT: 67.52 IN | WEIGHT: 170.2 LBS | HEIGHT: 67.52 IN | WEIGHT: 170.2 LBS | BODY MASS INDEX: 26.1 KG/M2

## 2018-07-28 VITALS — SYSTOLIC BLOOD PRESSURE: 119 MMHG | OXYGEN SATURATION: 96 % | HEART RATE: 78 BPM | DIASTOLIC BLOOD PRESSURE: 90 MMHG

## 2018-07-28 VITALS — TEMPERATURE: 98.06 F

## 2018-07-28 DIAGNOSIS — Z79.899: ICD-10-CM

## 2018-07-28 DIAGNOSIS — K59.00: Primary | ICD-10-CM

## 2018-07-28 DIAGNOSIS — F17.210: ICD-10-CM

## 2018-07-28 DIAGNOSIS — K62.89: ICD-10-CM

## 2018-07-28 DIAGNOSIS — E78.5: ICD-10-CM

## 2018-07-28 DIAGNOSIS — N40.0: ICD-10-CM

## 2018-07-28 DIAGNOSIS — I25.10: ICD-10-CM

## 2018-07-28 DIAGNOSIS — F41.9: ICD-10-CM

## 2018-07-28 DIAGNOSIS — K21.9: ICD-10-CM

## 2018-07-28 DIAGNOSIS — Z82.49: ICD-10-CM

## 2018-07-28 DIAGNOSIS — M19.019: ICD-10-CM

## 2018-07-28 DIAGNOSIS — I10: ICD-10-CM

## 2018-07-28 DIAGNOSIS — Z85.46: ICD-10-CM

## 2018-07-28 DIAGNOSIS — Z79.82: ICD-10-CM

## 2018-07-28 DIAGNOSIS — F43.10: ICD-10-CM

## 2018-07-28 LAB
ALBUMIN SERPL-MCNC: 4.2 GM/DL (ref 3.4–5)
ALP SERPL-CCNC: 61 U/L (ref 45–117)
ALT SERPL-CCNC: 33 U/L (ref 12–78)
AST SERPL-CCNC: 25 U/L (ref 15–37)
BASOPHILS # BLD: 0.01 K/UL (ref 0–0.2)
BASOPHILS NFR BLD: 0.1 %
BUN SERPL-MCNC: 24 MG/DL (ref 7–18)
CALCIUM SERPL-MCNC: 8.8 MG/DL (ref 8.5–10.1)
CO2 SERPL-SCNC: 27 MMOL/L (ref 21–32)
CREAT SERPL-MCNC: 1.1 MG/DL (ref 0.6–1.4)
EOS ABS #: 0 K/UL (ref 0–0.5)
EOSINOPHIL NFR BLD AUTO: 235 K/UL (ref 130–400)
GLUCOSE SERPL-MCNC: 116 MG/DL (ref 70–99)
HCT VFR BLD CALC: 42.7 % (ref 42–52)
HGB BLD-MCNC: 15.2 G/DL (ref 14–18)
IG#: 0.03 K/UL (ref 0–0.02)
IMM GRANULOCYTES NFR BLD AUTO: 5.3 %
LIPASE: 133 U/L (ref 73–393)
LYMPHOCYTES # BLD: 0.67 K/UL (ref 1.2–3.4)
MCH RBC QN AUTO: 33.6 PG (ref 25–34)
MCHC RBC AUTO-ENTMCNC: 35.6 G/DL (ref 32–36)
MCV RBC AUTO: 94.5 FL (ref 80–100)
MONO ABS #: 0.37 K/UL (ref 0.11–0.59)
MONOCYTES NFR BLD: 2.9 %
NEUT ABS #: 11.6 K/UL (ref 1.4–6.5)
NEUTROPHILS # BLD AUTO: 0 %
NEUTROPHILS NFR BLD AUTO: 91.5 %
PMV BLD AUTO: 9.2 FL (ref 7.4–10.4)
POTASSIUM SERPL-SCNC: 3.5 MMOL/L (ref 3.5–5.1)
PROT SERPL-MCNC: 7.5 GM/DL (ref 6.4–8.2)
RED CELL DISTRIBUTION WIDTH CV: 12.7 % (ref 11.5–14.5)
RED CELL DISTRIBUTION WIDTH SD: 44 FL (ref 36.4–46.3)
SODIUM SERPL-SCNC: 140 MMOL/L (ref 136–145)
WBC # BLD AUTO: 12.68 K/UL (ref 4.8–10.8)

## 2018-07-28 NOTE — DIAGNOSTIC IMAGING REPORT
CT SCAN OF THE ABDOMEN AND PELVIS WITHOUT CONTRAST



CLINICAL HISTORY: Abdominal pain and constipation    



COMPARISON STUDY:  No previous studies for comparison. 



TECHNIQUE: CT scan of the abdomen and pelvis was performed from the lung bases

to the proximal femurs. Images are reviewed in the axial, sagittal, and coronal

planes. IV contrast was not administered for this examination.  A dose lowering

technique was utilized adhering to the principles of ALARA.





CT DOSE: 1071.28 mGy.cm

FINDINGS:



Lower chest: The heart is normal in size and configuration, without pericardial

effusion. The lung bases and pleural spaces are clear.



Liver: The unenhanced liver is normal in size, contour, and attenuation. There

is no intrahepatic biliary ductal dilatation.



Gallbladder: Unremarkable.



Spleen: Normal in size and attenuation.



Pancreas: Unremarkable.



Adrenal glands: Unremarkable.



Kidneys: No renal, ureteral, or bladder calculi are visualized. There is a 6.3

cm right renal cyst. There is a 23 mm hyperdense right renal mass. A dedicated

renal CT scan or MRI could be obtained in follow-up to differentiate a solid

renal mass from hyperdense cyst.



Bowel: There is a calcification within the peritoneal fat adjacent to the

stomach. There are no transition zones indicate bowel obstruction. There is no

acute appendicitis. There is fecal retention. The rectum measures 7 cm in

diameter. There is rectal wall thickening and infiltration the perirectal fat.

The findings are suggestive of stercoral colitis.



Peritoneum: There is no intraperitoneal free air or abdominal ascites.



Vasculature: The abdominal aorta is normal in course and caliber.



Adenopathy: None.



Pelvic viscera: There is indwelling Valdes catheter. The prostate is enlarged

measuring 58 mm. There are postsurgical changes of a hernia mesh repair.



Skeletal structures: No destructive osseous lesions are seen.



IMPRESSION:  

1. No evidence of bowel obstruction. No evidence of free air

2. No renal, ureteral, or bladder calculi identified

3. Fecal retention. Distended rectum measuring 7 cm in diameter. Rectal wall

thickening and infiltration of the perirectal fat. 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

of sternal pleural colitis.

4. Prostamegaly

5. Indeterminate 23 mm hyperdense right renal mass. A dedicated renal CT or MRI

could be obtained in follow-up for further evaluation as deemed clinically

appropriate







Electronically signed by:  Ishmael Pierce M.D.

7/28/2018 1:02 PM



Dictated Date/Time:  7/28/2018 12:54 PM

## 2018-07-28 NOTE — EMERGENCY ROOM VISIT NOTE
History


Report prepared by Amy:  Mei Figueroa


Under the Supervision of:  Dr. Eulalio Grimes M.D.


First contact with patient:  10:54


Chief Complaint:  CONSTIPATION


Stated Complaint:  CONSTIPATED WITH LIQUID COMING OUT


Nursing Triage Summary:  


c/o constipation. Last BM was 07/26





History of Present Illness


The patient is a 78 year old male who presents to the Emergency Room with 

complaints of constipation beginning yesterday. The patient states that he was 

[facility]. He reports that he was just in [place] and that he had 

normal bowel movements. The patient states that yesterday he drove all night 

and started to feel constipated. The patient reports that he can feel that his 

stool is packed. The patient now reports having rectal pain. The patient denies 

having trouble urinating, but does report having some back pain.  He states 

that his last bowel movement was 2 nights ago and he states that he normally 

goes twice per day. The patient reports a history of issues with constipation 

and anal fissures. He denies currently being on pain killers. The patient 

reports that he takes aspirin daily.





   Source of History:  patient


   Onset:  yesterday


   Position:  other (rectum)


   Quality:  other (constipation)


   Associated Symptoms:  + back pain, No urinary symptoms


Note:


additional symptom: rectal pain





Review of Systems


See HPI for pertinent positives & negatives. A total of 10 systems reviewed and 

were otherwise negative.





Past Medical & Surgical


Medical Problems:


(1) Anxiety State Nos


(2) Coronary Atherosclerosis Of Native Coronary Vessel


(3) Diverticulosis Colon (W/O Ment Of Hemorrhage)


(4) Esophageal Reflux


(5) Fracture Five Ribs-Close


(6) Hyperlipidemia Nec/Nos


(7) Hypertension Nos


(8) Hypertrophy Of Prostate (Benign)


(9) Int Hemorrhoid W/O Compl


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


(11) Osteoarthritis of shoulder


(12) Posttraumatic Stress Disorder


(13) Tobacco Use Disorder


(14) Traum Pneumothorax-Close


Surgical Problems:


(1) Umbilical Hernia W Obstr





Old medical records were reviewed. Nurse's notes were reviewed and I agree with.





Family History





Hypertension





Social History


Smoking Status:  Never Smoker


Alcohol Use:  none


Drug Use:  none


Marital Status:  


Housing Status:  lives with family


Occupation Status:  retired





Current/Historical Medications


Scheduled


Alprazolam (Xanax), 0.5 MG PO HS


Aspirin Enteric Coated (Ecotrin Or Generic), 81 MG PO QPM


Calcium/Vitamin D (Os-Alexander 500 Plus D), 1 TAB PO BID


Ezetimibe/Simvastatin (Vytorin 10MG/20MG), 0.5 TABLET PO HS


Fish Oil (Omega-3), 1 CAP PO BID


Glucosamine-Chondroitin-Vit C- (Glucosamine Chondroitin), 1 TAB PO BID


Hydrocortisone Acetate (Rectal (Anusol-Hc), 1 SUPP IA BID


Irbesartan (Avapro), 150 MG PO QAM


Melatonin (Kp Melatonin), 1 TAB PO HS


Metoprolol Tartrate (Lopressor), 25 MG PO BID


Multivitamin (Multivitamin), 1 TABLET PO QAM


Omeprazole (Ra Omeprazole), 20 MG PO QPM


Terazosin Hcl (Hytrin), 1 MG PO HS





Allergies


Coded Allergies:  


     No Known Allergies (Verified , 7/28/18)





Physical Exam


Vital Signs











  Date Time  Temp Pulse Resp B/P (MAP) Pulse Ox O2 Delivery O2 Flow Rate FiO2


 


7/28/18 14:56  78 18 119/90 96   


 


7/28/18 12:56  78 20 175/89 97 Room Air  


 


7/28/18 10:35 36.7 96 18 191/123 94 Room Air  











Physical Exam


General: Non-ill appearing older male in no acute distress. 


HEENT: Normal cephalic atraumatic.  Pupils are equal round and reactive to 

light.  Extraocular movements are intact.  Oropharynx is pink with moist mucous 

membranes.  No swelling of the mouth lips or tongue.


Neck: Supple with a midline trachea.  No meningeal signs or stiffness, no JVD 

or bruits. No Stridor.


Chest: Clear to auscultation bilaterally.  No wheezes or rhonchi.  No increased 

work of breathing.


Heart: regular rate and rhythm. 


Abdomen: Soft nontender, nondistended without rebound guarding or rigidity.  


Extremities: No cyanosis clubbing or edema. No calf tenderness or assymetry


Spine/Back. Non tender to palpation. No CVA tenderness


Rectal: Normal rectal tone. Tender in rectal area. No bleeding.


Skin: Good turgor without rashes.


Neurologic exam: Cranial nerves two through 12 are intact.  Motor and sensation 

are intact and symmetrical throughout.





Medical Decision & Procedures


ER Provider


Diagnostic Interpretation:


Radiology results as stated below per my review and radiologist interpretation:





CT LUMBAR SPINE WITHOUT





CT DOSE:    





CLINICAL HISTORY: Severe back pain.    





TECHNIQUE: Helical images were acquired in transverse plane. Reformatted


sagittal and coronal images were reviewed.  A dose lowering technique was


utilized adhering to the principles of ALARA.








CONTRAST: No contrast was administered





COMPARISON STUDY: None.





FINDINGS: 


L1-2 level: There is disc degeneration and minimal scarring. There is no


significant spinal or foraminal stenosis


L2-3 level: There is a minor circumferential disc bulge. There is no significant


spinal or foraminal stenosis


L3-4 level: There is a broad-based central disc protrusion with mild to moderate


spinal stenosis. There is no significant foraminal narrowing


L4-5 level: There is a circumferential disc bulge with mild spinal stenosis.


There is facet joint arthropathy. There is no significant foraminal narrowing


L5-S1 level: There is a mild circumferential disc bulge. There is no significant


spinal or foraminal stenosis.





No acute fractures or traumatic subluxations are visualized.





IMPRESSION: 


1. No acute fractures or traumatic subluxations identified


2. Multilevel spondylitic changes.


3. Mild spinal stenosis the L4-5 level, mild to moderate spinal stenosis at the


L3-4 level. 











Electronically signed by:  Ishmael Pierce M.D.


7/28/2018 1:08 PM





Dictated Date/Time:  7/28/2018 1:06 PM








CT SCAN OF THE ABDOMEN AND PELVIS WITHOUT CONTRAST





CLINICAL HISTORY: Abdominal pain and constipation    





COMPARISON STUDY:  No previous studies for comparison. 





TECHNIQUE: CT scan of the abdomen and pelvis was performed from the lung bases


to the proximal femurs. Images are reviewed in the axial, sagittal, and coronal


planes. IV contrast was not administered for this examination.  A dose lowering


technique was utilized adhering to the principles of ALARA.








CT DOSE: 1071.28 mGy.cm


FINDINGS:





Lower chest: The heart is normal in size and configuration, without pericardial


effusion. The lung bases and pleural spaces are clear.





Liver: The unenhanced liver is normal in size, contour, and attenuation. There


is no intrahepatic biliary ductal dilatation.





Gallbladder: Unremarkable.





Spleen: Normal in size and attenuation.





Pancreas: Unremarkable.





Adrenal glands: Unremarkable.





Kidneys: No renal, ureteral, or bladder calculi are visualized. There is a 6.3


cm right renal cyst. There is a 23 mm hyperdense right renal mass. A dedicated


renal CT scan or MRI could be obtained in follow-up to differentiate a solid


renal mass from hyperdense cyst.





Bowel: There is a calcification within the peritoneal fat adjacent to the


stomach. There are no transition zones indicate bowel obstruction. There is no


acute appendicitis. There is fecal retention. The rectum measures 7 cm in


diameter. There is rectal wall thickening and infiltration the perirectal fat.


The findings are suggestive of stercoral colitis.





Peritoneum: There is no intraperitoneal free air or abdominal ascites.





Vasculature: The abdominal aorta is normal in course and caliber.





Adenopathy: None.





Pelvic viscera: There is indwelling May catheter. The prostate is enlarged


measuring 58 mm. There are postsurgical changes of a hernia mesh repair.





Skeletal structures: No destructive osseous lesions are seen.





IMPRESSION:  


1. No evidence of bowel obstruction. No evidence of free air


2. No renal, ureteral, or bladder calculi identified


3. Fecal retention. Distended rectum measuring 7 cm in diameter. Rectal wall


thickening and infiltration of the perirectal fat. 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


of sternal pleural colitis.


4. Prostamegaly


5. Indeterminate 23 mm hyperdense right renal mass. A dedicated renal CT or MRI


could be obtained in follow-up for further evaluation as deemed clinically


appropriate











Electronically signed by:  Ishmael Pierce M.D.


7/28/2018 1:02 PM





Dictated Date/Time:  7/28/2018 12:54 PM





Laboratory Results


7/28/18 12:15








Red Blood Count 4.52, Mean Corpuscular Volume 94.5, Mean Corpuscular Hemoglobin 

33.6, Mean Corpuscular Hemoglobin Concent 35.6, Mean Platelet Volume 9.2, 

Neutrophils (%) (Auto) 91.5, Lymphocytes (%) (Auto) 5.3, Monocytes (%) (Auto) 

2.9, Eosinophils (%) (Auto) 0.0, Basophils (%) (Auto) 0.1, Neutrophils # (Auto) 

11.60, Lymphocytes # (Auto) 0.67, Monocytes # (Auto) 0.37, Eosinophils # (Auto) 

0.00, Basophils # (Auto) 0.01





7/28/18 12:15

















Test


  7/28/18


12:15 7/28/18


12:35


 


White Blood Count


  12.68 K/uL


(4.8-10.8) 


 


 


Red Blood Count


  4.52 M/uL


(4.7-6.1) 


 


 


Hemoglobin


  15.2 g/dL


(14.0-18.0) 


 


 


Hematocrit 42.7 % (42-52)  


 


Mean Corpuscular Volume


  94.5 fL


() 


 


 


Mean Corpuscular Hemoglobin


  33.6 pg


(25-34) 


 


 


Mean Corpuscular Hemoglobin


Concent 35.6 g/dl


(32-36) 


 


 


Platelet Count


  235 K/uL


(130-400) 


 


 


Mean Platelet Volume


  9.2 fL


(7.4-10.4) 


 


 


Neutrophils (%) (Auto) 91.5 %  


 


Lymphocytes (%) (Auto) 5.3 %  


 


Monocytes (%) (Auto) 2.9 %  


 


Eosinophils (%) (Auto) 0.0 %  


 


Basophils (%) (Auto) 0.1 %  


 


Neutrophils # (Auto)


  11.60 K/uL


(1.4-6.5) 


 


 


Lymphocytes # (Auto)


  0.67 K/uL


(1.2-3.4) 


 


 


Monocytes # (Auto)


  0.37 K/uL


(0.11-0.59) 


 


 


Eosinophils # (Auto)


  0.00 K/uL


(0-0.5) 


 


 


Basophils # (Auto)


  0.01 K/uL


(0-0.2) 


 


 


RDW Standard Deviation


  44.0 fL


(36.4-46.3) 


 


 


RDW Coefficient of Variation


  12.7 %


(11.5-14.5) 


 


 


Immature Granulocyte % (Auto) 0.2 %  


 


Immature Granulocyte # (Auto)


  0.03 K/uL


(0.00-0.02) 


 


 


Anion Gap


  6.0 mmol/L


(3-11) 


 


 


Est Creatinine Clear Calc


Drug Dose 54.4 ml/min 


  


 


 


Estimated GFR (


American) 75.2 


  


 


 


Estimated GFR (Non-


American 64.9 


  


 


 


BUN/Creatinine Ratio 21.5 (10-20)  


 


Calcium Level


  8.8 mg/dl


(8.5-10.1) 


 


 


Total Bilirubin


  1.0 mg/dl


(0.2-1) 


 


 


Direct Bilirubin


  0.3 mg/dl


(0-0.2) 


 


 


Aspartate Amino Transf


(AST/SGOT) 25 U/L (15-37) 


  


 


 


Alanine Aminotransferase


(ALT/SGPT) 33 U/L (12-78) 


  


 


 


Alkaline Phosphatase


  61 U/L


() 


 


 


Total Protein


  7.5 gm/dl


(6.4-8.2) 


 


 


Albumin


  4.2 gm/dl


(3.4-5.0) 


 


 


Lipase


  133 U/L


() 


 


 


Prostate Specific Antigen


  0.599 ng/ml


(0.000-4.000) 


 


 


Urine Color  DK YELLOW 


 


Urine Appearance  CLEAR (CLEAR) 


 


Urine pH  6.5 (4.5-7.5) 


 


Urine Specific Gravity


  


  1.021


(1.000-1.030)


 


Urine Protein  NEG (NEG) 


 


Urine Glucose (UA)  NEG (NEG) 


 


Urine Ketones  NEG (NEG) 


 


Urine Occult Blood  NEG (NEG) 


 


Urine Nitrite  NEG (NEG) 


 


Urine Bilirubin  NEG (NEG) 


 


Urine Urobilinogen  NEG (NEG) 


 


Urine Leukocyte Esterase  NEG (NEG) 





Laboratory studies as stated above per my review.





Medications Administered











 Medications


  (Trade)  Dose


 Ordered  Sig/Mary


 Route  Start Time


 Stop Time Status Last Admin


Dose Admin


 


 Sodium Chloride  250 ml @ 


 999 mls/hr  Q16M STAT


 IV  7/28/18 11:59


 7/28/18 12:14 DC 7/28/18 12:56


999 MLS/HR


 


 Sodium Chloride  1,000 ml @ 


 100 mls/hr  Q10H STAT


 IV  7/28/18 11:59


 7/28/18 21:58  7/28/18 12:56


100 MLS/HR


 


 Miscellaneous


  (Soap Suds Enema)  1 ea  ONE  STAT


 IA  7/28/18 13:35


 7/28/18 13:36 DC 7/28/18 14:02


1 EA


 


 Hydrocortisone


 Acetate


  (Anusol Hc Supp)  25 mg  NOW  STAT


 IA  7/28/18 14:50


 7/28/18 14:51 DC 7/28/18 14:57


25 MG











ED Course


1058: Past medical records reviewed. The patient was evaluated in room C10, and 

a complete history and physical examination were performed.





1125: Discussed the patient's case with Dr. Garner. He said that the patient 

could have an enema, but to be gentle.





1155: I checked on the patient. He said that he had a hard time urinating so we 

will do a CT scan instead of X-Rays. 





1159: Ordered Sodium Chloride 1,000 ml @ 100 mls/hr IV, Sodium Chloride 250 ml 

@ 999 mls/hr IV. 





1225: I checked on the patient and we are getting a urine on him.





1306: I checked on the patient and he has a may catheter in. He just returned 

from a CT scan. 





1320: Discussed the patient's case with Dr. Janki SHEFFIELD. He recommended manually 

disimpacting the patient and putting him on Anusol HC. 





1334: I checked on the patient and he is resting comfortably. He agrees with 

the plan. I will order an enema. 





1335: Ordered Soap Suds Enema 1 ea IA. 





1409: The patient had an enema and is currently trying to have a bowel movement.





1425: I went to check on the patient. He had a large bowel movement and is 

feeling better. 





1450: Ordered Anusol Hc Supp 25 mg IA. 





1452: Upon reevaluation, the patient is resting. I discussed the results and 

treatment plan with him. He verbalized agreement of the treatment plan. The 

patient was discharged home.





Medical Decision


Differentials include, but are not limited to; constipation, prostate cancer 

complications, cauda equina syndrome, electrolyte and metabolic abnormalities, 

and infection.





This patient comes in as described above appears placed in room C10.  He is 

complaining of constipation since yesterday.  He has some rectal pain has 

passing small amounts of liquid.  He tried to mainly disimpact himself and so 

there is a large firm area he says is very tender in the rectum.  He has no 

numbness or weakness in his legs.  On exam, he has no numbness or weakness in 

his buttocks area.  I did order x-rays.  His wife told me that years ago they 

were told that after his external radiation that he should not any rectal 

procedures I did check this with Dr. Garner who felt that it was okay now but 

to try to be gentle.  The patient and started complaining that he felt like he 

had a hard time urinating cut because it was painful mostly his rectal area so 

I did do a urine cath.  With these symptoms I opted to do blood work and check 

his PSA as well as do CAT scans rather than x-rays.  Certainly with a prostate 

cancer history although he tells me he is in remission with this I concerned 

about bony metastases.  His urinalysis does not suggest a UTI.  He has no 

significant electrolyte or metabolic abnormalities.  His PSAs is within normal 

limit.  CAT scans show rectal fecal impaction with some distention and 

Stercolitis.  I did discuss this with Dr. Chopra the GI specialist on-call who 

recommended disimpacting him and started him on Anusol HC suppositories. he 

will need follow-up with a GI specialist to scope him eventually when the 

swelling goes down to ensure there is no other etiology for such as rectal 

cancer explains the patient is wife he was given a dose of Anusol HC to go home 

with as well as a prescription.  I talked him about possibly doing an MRI.  He 

did have some bladder issues which I think were likely just related to 

constipation.  I told him that given the symptoms, we could do an MRI to rule 

out neurologic process.  He has no numbness or weakness and has intact 

sensation in the buttocks area.  He did declines this and says he feels better 

and just wants to go home.  I did warn him to return if he has any new symptoms 

however.  He was encouraged to return if: increasing pain, worsening of symptoms

, any new problems or concerns.





Medication Reconcilliation


Current Medication List:  was personally reviewed by me





Blood Pressure Screening


Patient's blood pressure:  Elevated blood pressure


Blood pressure disposition:  Elevated BP felt to be situational





Consults


Time Called:  1109


Consulting Physician:  Dr. Garner- Urology


Returned Call:  1125


Discussed the patient's case with Dr. Garner. He said that the patient could 

have an enema, but to be gentle.


Additional Consults:  


   Time Called:  1316


   Consulted Physician:  Dr. Janki SHEFFIELD


   Returned Call:  1320


Additional Comments:


Discussed the patient's case with Dr. Janki SHEFFIELD. He recommended manually 

disimpacting the patient and putting him on Anusol HC.





Impression





 Primary Impression:  


 Constipation


 Additional Impression:  


 Proctitis





Scribe Attestation


The scribe's documentation has been prepared under my direction and personally 

reviewed by me in its entirety. I confirm that the note above accurately 

reflects all work, treatment, procedures, and medical decision making performed 

by me.





Departure Information


Dispostion


Home / Self-Care





Prescriptions





Hydrocortisone Acetate (Rectal (ANUSOL-HC) 25 Mg Sup


1 SUPP IA BID for 7 Days, #14 SUPP


   Prov: Eulalio Grimes M.D.         7/28/18





Referrals


Shukri Diaz Jr,D.O. (PCP)





Forms


HOME CARE DOCUMENTATION FORM,                                                 

               IMPORTANT VISIT INFORMATION





Patient Instructions


My [facility]





Additional Instructions





Rest.


Use MiraLAX or another stool softener


Use Anusol suppositories twice a day


Ensure your bowels are moving adequately





Return to the ER if: increasing pain, problems with bowel or bladder function, 

numbness weakness, fever or chills, any new problems or concerns





Follow-up with your doctor on Monday for recheck, you may need to be seen by a 

GI specialist to have a scope to look at your rectal inflammation once you are 

feeling better to rule out other problems





Problem Qualifiers

## 2018-07-28 NOTE — DIAGNOSTIC IMAGING REPORT
CT LUMBAR SPINE WITHOUT



CT DOSE:    



CLINICAL HISTORY: Severe back pain.    



TECHNIQUE: Helical images were acquired in transverse plane. Reformatted

sagittal and coronal images were reviewed.  A dose lowering technique was

utilized adhering to the principles of ALARA.





CONTRAST: No contrast was administered



COMPARISON STUDY: None.



FINDINGS: 

L1-2 level: There is disc degeneration and minimal scarring. There is no

significant spinal or foraminal stenosis

L2-3 level: There is a minor circumferential disc bulge. There is no significant

spinal or foraminal stenosis

L3-4 level: There is a broad-based central disc protrusion with mild to moderate

spinal stenosis. There is no significant foraminal narrowing

L4-5 level: There is a circumferential disc bulge with mild spinal stenosis.

There is facet joint arthropathy. There is no significant foraminal narrowing

L5-S1 level: There is a mild circumferential disc bulge. There is no significant

spinal or foraminal stenosis.



No acute fractures or traumatic subluxations are visualized.



IMPRESSION: 

1. No acute fractures or traumatic subluxations identified

2. Multilevel spondylitic changes.

3. Mild spinal stenosis the L4-5 level, mild to moderate spinal stenosis at the

L3-4 level. 







Electronically signed by:  Ishmael Pierce M.D.

7/28/2018 1:08 PM



Dictated Date/Time:  7/28/2018 1:06 PM